# Patient Record
Sex: FEMALE | Race: WHITE | NOT HISPANIC OR LATINO | URBAN - NONMETROPOLITAN AREA
[De-identification: names, ages, dates, MRNs, and addresses within clinical notes are randomized per-mention and may not be internally consistent; named-entity substitution may affect disease eponyms.]

---

## 2019-09-26 ENCOUNTER — IMPORTED ENCOUNTER (OUTPATIENT)
Dept: URBAN - NONMETROPOLITAN AREA CLINIC 1 | Facility: CLINIC | Age: 54
End: 2019-09-26

## 2019-09-26 PROBLEM — H52.03: Noted: 2018-06-22

## 2019-09-26 PROBLEM — H52.4: Noted: 2019-09-26

## 2019-09-26 PROCEDURE — 92310 CONTACT LENS FITTING OU: CPT

## 2019-09-26 PROCEDURE — S0621 ROUTINE OPHTHALMOLOGICAL EXA: HCPCS

## 2019-09-26 NOTE — PATIENT DISCUSSION
Hyperopia/Presbyopia OU - New glasses Rx given today - New contact lens Rx given today Cataracts OU - Discussed diagnosis in detail with patient - Recommend UV protection - No treatment needed at this time- Continue to monitor; 's Notes: MR 9/26/19

## 2022-04-09 ASSESSMENT — VISUAL ACUITY
OS_CC: 20/100
OD_PH: 20/40
OD_CC: 20/60
OS_PH: 20/40
OD_SC: 20/30-1
OS_SC: 20/25
OU_SC: 20/30-1

## 2022-04-09 ASSESSMENT — TONOMETRY
OD_IOP_MMHG: 14
OS_IOP_MMHG: 14

## 2023-01-31 RX ORDER — IBUPROFEN 600 MG/1
600 TABLET ORAL EVERY 6 HOURS PRN
COMMUNITY
Start: 2017-11-07

## 2023-01-31 RX ORDER — HYDROXYZINE HYDROCHLORIDE 25 MG/1
50 TABLET, FILM COATED ORAL
COMMUNITY
Start: 2015-11-11

## 2023-01-31 RX ORDER — RIMEGEPANT SULFATE 75 MG/75MG
75 TABLET, ORALLY DISINTEGRATING ORAL
COMMUNITY

## 2023-01-31 RX ORDER — METHOCARBAMOL 750 MG/1
750 TABLET, FILM COATED ORAL 4 TIMES DAILY
COMMUNITY
Start: 2017-11-07